# Patient Record
Sex: MALE | Race: WHITE | NOT HISPANIC OR LATINO | Employment: UNEMPLOYED
[De-identification: names, ages, dates, MRNs, and addresses within clinical notes are randomized per-mention and may not be internally consistent; named-entity substitution may affect disease eponyms.]

---

## 2023-05-21 ENCOUNTER — HEALTH MAINTENANCE LETTER (OUTPATIENT)
Age: 23
End: 2023-05-21

## 2023-05-23 ENCOUNTER — OFFICE VISIT (OUTPATIENT)
Dept: INTERNAL MEDICINE | Facility: CLINIC | Age: 23
End: 2023-05-23
Payer: COMMERCIAL

## 2023-05-23 ENCOUNTER — LAB (OUTPATIENT)
Dept: LAB | Facility: CLINIC | Age: 23
End: 2023-05-23
Payer: COMMERCIAL

## 2023-05-23 VITALS
HEIGHT: 75 IN | OXYGEN SATURATION: 98 % | BODY MASS INDEX: 26.96 KG/M2 | HEART RATE: 72 BPM | SYSTOLIC BLOOD PRESSURE: 166 MMHG | WEIGHT: 216.8 LBS | DIASTOLIC BLOOD PRESSURE: 106 MMHG

## 2023-05-23 DIAGNOSIS — R03.0 ELEVATED BLOOD PRESSURE READING WITHOUT DIAGNOSIS OF HYPERTENSION: ICD-10-CM

## 2023-05-23 DIAGNOSIS — Z13.220 SCREENING FOR HYPERLIPIDEMIA: ICD-10-CM

## 2023-05-23 DIAGNOSIS — Z11.59 ENCOUNTER FOR HEPATITIS C SCREENING TEST FOR LOW RISK PATIENT: ICD-10-CM

## 2023-05-23 DIAGNOSIS — Z00.00 ROUTINE GENERAL MEDICAL EXAMINATION AT A HEALTH CARE FACILITY: Primary | ICD-10-CM

## 2023-05-23 LAB
ANION GAP SERPL CALCULATED.3IONS-SCNC: 10 MMOL/L (ref 7–15)
BUN SERPL-MCNC: 7.1 MG/DL (ref 6–20)
CALCIUM SERPL-MCNC: 9.8 MG/DL (ref 8.6–10)
CHLORIDE SERPL-SCNC: 107 MMOL/L (ref 98–107)
CHOLEST SERPL-MCNC: 243 MG/DL
CREAT SERPL-MCNC: 0.85 MG/DL (ref 0.51–1.17)
DEPRECATED HCO3 PLAS-SCNC: 24 MMOL/L (ref 22–29)
GFR SERPL CREATININE-BSD FRML MDRD: >90 ML/MIN/1.73M2
GLUCOSE SERPL-MCNC: 93 MG/DL (ref 70–99)
HCV AB SERPL QL IA: NONREACTIVE
HDLC SERPL-MCNC: 68 MG/DL
HOLD SPECIMEN: NORMAL
LDLC SERPL CALC-MCNC: 166 MG/DL
NONHDLC SERPL-MCNC: 175 MG/DL
POTASSIUM SERPL-SCNC: 4.9 MMOL/L (ref 3.4–5.3)
SODIUM SERPL-SCNC: 141 MMOL/L (ref 136–145)
TRIGL SERPL-MCNC: 44 MG/DL

## 2023-05-23 PROCEDURE — 80061 LIPID PANEL: CPT | Performed by: PATHOLOGY

## 2023-05-23 PROCEDURE — 80048 BASIC METABOLIC PNL TOTAL CA: CPT | Performed by: PATHOLOGY

## 2023-05-23 PROCEDURE — 36415 COLL VENOUS BLD VENIPUNCTURE: CPT | Performed by: PATHOLOGY

## 2023-05-23 PROCEDURE — 86803 HEPATITIS C AB TEST: CPT | Performed by: INTERNAL MEDICINE

## 2023-05-23 PROCEDURE — 99385 PREV VISIT NEW AGE 18-39: CPT | Mod: GE

## 2023-05-23 NOTE — PROGRESS NOTES
[facility]         HPI:       Ebenezer Anand is a 23 year old adult with no significant PMH who presents for an annual physical. Graduated in 2020. Does a lot of walking, likes hiking and camping. Denies any tick exposure. Exercises through [employer], practices every day for about 20 minutes. Walks about 2.5 miles every day. Has a high carb diet, likes bagels with cream, pizzas, noodles, eggs. Would be open to adding some greens in his every day diet. Denies any joint pain. Denies any recent weight loss. He is open to getting a blood pressure cuff. Family history of hypertension, does not know at what age he was diagnosed. No history of kidney problems. No history of congenital heart disease. He does vape cannabis, about 10 puffs daily. Eight drinks per week. Has one female partner. Does use condoms. Had STDs checked last month. Tetanus and covid booster pending. All other immunizations up to date. Discussed with patient the elevated blood pressure. The first reading of elevated blood pressure was one year ago, three years ago his blood pressure was normal. Mayco notes that his dad and several of his uncles have hypertension. Denies any other symptom.            Review of Systems:     ROS  I have personally reviewed and updated the complete ROS on the day of the visit.             Past Medical History:   No past medical history on file.         Past Surgical History:   No past surgical history on file.    No current outpatient medications on file.        No Known Allergies         Family History:   No family history on file.         Social History:     Social History     Socioeconomic History     Marital status: Single     Spouse name: Not on file     Number of children: Not on file     Years of education: Not on file     Highest education level: Not on file   Occupational History     Not on file   Tobacco Use     Smoking status: Every Day     Types: Cigarettes, Vaping Device     Start date:  "1/1/2018     Smokeless tobacco: Never   Vaping Use     Vaping status: Not on file   Substance and Sexual Activity     Alcohol use: Yes     Alcohol/week: 8.0 standard drinks of alcohol     Types: 8 Standard drinks or equivalent per week     Drug use: Yes     Types: Marijuana     Comment: pt takes edibles     Sexual activity: Not on file   Other Topics Concern     Not on file   Social History Narrative     Not on file     Social Determinants of Health     Financial Resource Strain: Not on file   Food Insecurity: Not on file   Transportation Needs: Not on file   Physical Activity: Not on file   Stress: Not on file   Social Connections: Not on file   Intimate Partner Violence: Not on file   Housing Stability: Not on file            Physical Exam:   BP (!) 166/106 (BP Location: Right arm, Patient Position: Sitting, Cuff Size: Adult Regular)   Pulse 72   Ht 1.906 m (6' 3.04\")   Wt 98.3 kg (216 lb 12.8 oz)   SpO2 98%   BMI 27.07 kg/m    Body mass index is 27.07 kg/m .  Vitals were reviewed       GENERAL APPEARANCE: healthy, alert and no distress     EYES: EOMI,  PERRL     HENT: ear canals and TM's normal and nose and mouth without ulcers or lesions     NECK: no adenopathy, no asymmetry, masses, or scars and thyroid normal to palpation     RESP: lungs clear to auscultation - no rales, rhonchi or wheezes     CV: regular rates and rhythm, split S1, S2, no S3 or S4 and no murmur, click or rub     ABDOMEN:  soft, nontender, no HSM or masses and bowel sounds normal     MS: extremities normal- no gross deformities noted, no evidence of inflammation in joints, FROM in all extremities.     SKIN: no suspicious lesions or rashes     NEURO: Normal strength and tone, sensory exam grossly normal, mentation intact and speech normal     PSYCH: mentation appears normal. and affect normal/bright     LYMPHATICS: No cervical adenopathy    Assessment and Plan     Ebenezer was seen today for establish care.    Diagnoses and all orders for " this visit:    Elevated blood pressure reading without diagnosis of hypertension  -     Basic metabolic panel  (Ca, Cl, CO2, Creat, Gluc, K, Na, BUN); Future    Screening for hyperlipidemia  -     Lipid panel reflex to direct LDL Fasting; Future    Encounter for hepatitis C screening test for low risk patient  -     Hepatitis C antibody; Future      Mayco will obtain a blood pressure cuff and get weekly blood pressure measurements that he will bring to the clinic in our next appointment. Technique to get accurate readings demonstrated in the clinic. We will obtain a BMP to assess for end-organ damage. Of note, the patient has a blood pressure gradient (R arm BP > L arm -146). We will check BP on both arms, if the gradient persists, we will need to assess for post ductal aortic stenosis. Regarding diet, Mayco is near his goal weight. We will try to diversify his diet by adding five portions of green vegetables every week. Due for Covid and Tdap booster, Mayco agreed to a pharmacy walk-in appointment to have them administered.    Options for treatment and follow-up care were reviewed with the patient. Ebenezer Anand engaged in the decision making process and verbalized understanding of the options discussed and agreed with the final plan.    Follow-up with me in three months    Kandy Dang MD  Internal Medicine, PGY-2  [facility]  [phone]   May 23, 2023      Pt was seen and plan of care discussed with Dr. Mendosa.

## 2023-05-23 NOTE — NURSING NOTE
"Ebenezer Anand is a 23 year old adult patient that presents today in clinic for the following:    Chief Complaint   Patient presents with     Establish Care     Pt here to establish care and for annual physical.     The patient's allergies and medications were reviewed as noted. A set of vitals were recorded as noted without incident: BP (!) 166/106 (BP Location: Right arm, Patient Position: Sitting, Cuff Size: Adult Regular)   Pulse 72   Ht 1.906 m (6' 3.04\")   Wt 98.3 kg (216 lb 12.8 oz)   SpO2 98%   BMI 27.07 kg/m  . The patient does not have any other questions for the provider.    Helen Velazquez, EMT at 12:50 PM on 5/23/2023  "

## 2023-08-22 ENCOUNTER — LAB (OUTPATIENT)
Dept: LAB | Facility: CLINIC | Age: 23
End: 2023-08-22
Payer: COMMERCIAL

## 2023-08-22 ENCOUNTER — OFFICE VISIT (OUTPATIENT)
Dept: INTERNAL MEDICINE | Facility: CLINIC | Age: 23
End: 2023-08-22
Payer: COMMERCIAL

## 2023-08-22 VITALS
WEIGHT: 217.4 LBS | SYSTOLIC BLOOD PRESSURE: 160 MMHG | BODY MASS INDEX: 27.03 KG/M2 | DIASTOLIC BLOOD PRESSURE: 98 MMHG | HEIGHT: 75 IN | OXYGEN SATURATION: 96 % | HEART RATE: 106 BPM

## 2023-08-22 DIAGNOSIS — I15.9 SECONDARY HYPERTENSION: ICD-10-CM

## 2023-08-22 DIAGNOSIS — I11.9 HYPERTENSIVE HEART DISEASE WITHOUT HEART FAILURE: ICD-10-CM

## 2023-08-22 DIAGNOSIS — R03.0 ELEVATED BLOOD PRESSURE READING WITHOUT DIAGNOSIS OF HYPERTENSION: ICD-10-CM

## 2023-08-22 LAB — TSH SERPL DL<=0.005 MIU/L-ACNC: 2.52 UIU/ML (ref 0.3–4.2)

## 2023-08-22 PROCEDURE — 99213 OFFICE O/P EST LOW 20 MIN: CPT | Mod: GE

## 2023-08-22 PROCEDURE — 82088 ASSAY OF ALDOSTERONE: CPT | Performed by: PEDIATRICS

## 2023-08-22 PROCEDURE — 99000 SPECIMEN HANDLING OFFICE-LAB: CPT | Performed by: PATHOLOGY

## 2023-08-22 PROCEDURE — 36415 COLL VENOUS BLD VENIPUNCTURE: CPT | Performed by: PATHOLOGY

## 2023-08-22 PROCEDURE — 83835 ASSAY OF METANEPHRINES: CPT | Mod: 90 | Performed by: PATHOLOGY

## 2023-08-22 PROCEDURE — 84443 ASSAY THYROID STIM HORMONE: CPT | Performed by: PATHOLOGY

## 2023-08-22 PROCEDURE — 84244 ASSAY OF RENIN: CPT | Mod: 90 | Performed by: PATHOLOGY

## 2023-08-22 NOTE — PROGRESS NOTES
[facility]         HPI:       Ebenezer Anand is a 23 year old adult with no significant PMH who presents for a blood pressure follow-up. The patient was found to have elevated blood pressure on prior visit and was instructed to obtain a blood pressure cuff to check his blood pressure regularly and come for a follow-up. The patient has been recording his blood pressure routinely for the last two weeks and has found it to be consistently elevated in the 150s-170s/100s. He does not have any other symptoms today, denying chest pain, headaches, vomiting, nausea, any change in his bowel or urinary habits or any change in his activity levels. Consumes about 6 glasses of alcohol week. Doesn't have palpitations. He occasionally snores at night, has one awakening during the night, but does not wake up tired and does not nap during the day. Thinks his dad had hypertension but no other family history of hypertension or early heart disease.         Review of Systems:     ROS  I have personally reviewed and updated the complete ROS on the day of the visit.             Past Medical History:   No past medical history on file.         Past Surgical History:   No past surgical history on file.    No current outpatient medications on file.        No Known Allergies         Family History:   No family history on file.         Social History:     Social History     Socioeconomic History     Marital status: Single     Spouse name: Not on file     Number of children: Not on file     Years of education: Not on file     Highest education level: Not on file   Occupational History     Not on file   Tobacco Use     Smoking status: Every Day     Types: Cigarettes, Vaping Device     Start date: 1/1/2018     Smokeless tobacco: Never   Substance and Sexual Activity     Alcohol use: Yes     Alcohol/week: 8.0 standard drinks of alcohol     Types: 8 Standard drinks or equivalent per week     Drug use: Yes     Types: Marijuana      "Comment: pt takes edibles     Sexual activity: Not on file   Other Topics Concern     Not on file   Social History Narrative     Not on file     Social Determinants of Health     Financial Resource Strain: Not on file   Food Insecurity: Not on file   Transportation Needs: Not on file   Physical Activity: Not on file   Stress: Not on file   Social Connections: Not on file   Intimate Partner Violence: Not on file   Housing Stability: Not on file            Physical Exam:   BP (!) 153/102 (BP Location: Right arm, Patient Position: Sitting, Cuff Size: Adult Regular)   Pulse 106   Ht 1.906 m (6' 3.04\")   Wt 98.6 kg (217 lb 6.4 oz)   SpO2 96%   BMI 27.14 kg/m    Body mass index is 27.14 kg/m .  Vitals were reviewed       GENERAL APPEARANCE: healthy, alert and no distress     EYES: EOMI,  PERRL     HENT: ear canals and TM's normal and nose and mouth without ulcers or lesions     NECK: no adenopathy, no asymmetry, masses, or scars and thyroid normal to palpation, no bruit appreciated     RESP: lungs clear to auscultation - no rales, rhonchi or wheezes     CV: regular rates and rhythm, normal S1 S2, no S3 or S4 and no murmur, click or rub     ABDOMEN:  soft, nontender, no HSM or masses and bowel sounds normal     MS: extremities normal- no gross deformities noted, no evidence of inflammation in joints, FROM in all extremities.     SKIN: no suspicious lesions or rashes     NEURO: Normal strength and tone, sensory exam grossly normal, mentation intact and speech normal     PSYCH: mentation appears normal. and affect normal/bright      Assessment and Plan     Mayco was seen today for blood pressure follow-up    #Hypertension  Multiple home blood pressure measurements consistent with hypertension. Mayco does not have any known risk factor for hypertension and is not aware of any pertinent family history. Will likely need to start a medication. Will perform secondary hypertension work-up.   - Urine/serum metanephrines  - " Renin/Aldosterone ratio  - TTE  - Ambulatory BP monitoring  - TSH with T4 reflex  - Follow-up in two months  - Await results prior to starting treatment    #Health maintenance  Up to date with immunizations, std screenings, no age appropriate screening required at this time.       Options for treatment and follow-up care were reviewed with the patient. Ebenezer Anand engaged in the decision making process and verbalized understanding of the options discussed and agreed with the final plan.    Kandy Dang MD  Internal Medicine, PGY-3  [facility]  [phone]   Aug 22, 2023      Plan of care discussed with Dr. Handy

## 2023-08-22 NOTE — NURSING NOTE
"Ebenezer Anand is a 23 year old adult patient that presents today in clinic for the following:    Chief Complaint   Patient presents with    Follow Up     3 month follow up     The patient's allergies and medications were reviewed as noted. A set of vitals were recorded as noted without incident: BP (!) 153/102 (BP Location: Right arm, Patient Position: Sitting, Cuff Size: Adult Regular)   Pulse 106   Ht 1.906 m (6' 3.04\")   Wt 98.6 kg (217 lb 6.4 oz)   SpO2 96%   BMI 27.14 kg/m  . The patient does not have any other questions for the provider.    David Briggs, EMT 12:18 PM on 8/22/2023   "

## 2023-08-25 LAB
ALDOST SERPL-MCNC: 15.8 NG/DL (ref 0–31)
ANNOTATION COMMENT IMP: ABNORMAL
METANEPHS SERPL-SCNC: 0.25 NMOL/L
NORMETANEPHRINE SERPL-SCNC: 1.39 NMOL/L
RENIN PLAS-CCNC: 2.6 NG/ML/HR

## 2023-08-28 ENCOUNTER — HOSPITAL ENCOUNTER (OUTPATIENT)
Dept: CARDIOLOGY | Facility: CLINIC | Age: 23
Discharge: HOME | End: 2023-08-28
Attending: PEDIATRICS
Payer: COMMERCIAL

## 2023-08-28 DIAGNOSIS — R03.0 ELEVATED BLOOD PRESSURE READING WITHOUT DIAGNOSIS OF HYPERTENSION: ICD-10-CM

## 2023-08-28 DIAGNOSIS — I15.9 SECONDARY HYPERTENSION: ICD-10-CM

## 2023-08-28 DIAGNOSIS — I11.9 HYPERTENSIVE HEART DISEASE WITHOUT HEART FAILURE: ICD-10-CM

## 2023-08-28 LAB
ALDOST/RENIN PLAS-RTO: 6.1 {RATIO} (ref 0–25)
LVEF ECHO: NORMAL

## 2023-08-28 PROCEDURE — 93306 TTE W/DOPPLER COMPLETE: CPT | Mod: 26 | Performed by: STUDENT IN AN ORGANIZED HEALTH CARE EDUCATION/TRAINING PROGRAM

## 2023-08-28 PROCEDURE — 93306 TTE W/DOPPLER COMPLETE: CPT

## 2023-08-28 PROCEDURE — 93786 AMBL BP MNTR W/SW REC ONLY: CPT

## 2023-08-28 PROCEDURE — 93790 AMBL BP MNTR W/SW I&R: CPT | Performed by: INTERNAL MEDICINE

## 2023-08-29 DIAGNOSIS — R79.89 ELEVATED PLASMA METANEPHRINES: Primary | ICD-10-CM

## 2023-09-05 RX ORDER — AMLODIPINE BESYLATE 5 MG/1
5 TABLET ORAL DAILY
Qty: 30 TABLET | Refills: 0 | Status: SHIPPED | OUTPATIENT
Start: 2023-09-05 | End: 2024-03-18

## 2024-03-15 ENCOUNTER — TELEPHONE (OUTPATIENT)
Dept: BEHAVIORAL HEALTH | Facility: CLINIC | Age: 24
End: 2024-03-15

## 2024-03-15 NOTE — TELEPHONE ENCOUNTER
"Pt is a(n) adult (18+ out of HS) Seeking as eval for Adult Mental Health DA for Programmatic Care - Program Preference? No.  Appointment scheduled by:  Patient.  (self-pay - complete Cost Estimate)  Caller name:  Mayco Anand    Caller phone #: [phone]  Legal Guardianship Reviewed?  No  Honoring Choices Notified?  No  Brief reason for appt:  MH eval     needed?  NO    Contact information verified/updated: Yes    Kary Morrison    \"We have scheduled your evaluation. In the event that your insurance coverage comes back as out of network, you may receive a call to cancel your appointment and direct you to your insurance company for in-network coverage.\"    Disclaimer regarding insurance read to patient?  Yes   "

## 2024-03-18 ENCOUNTER — VIRTUAL VISIT (OUTPATIENT)
Dept: FAMILY MEDICINE | Facility: CLINIC | Age: 24
End: 2024-03-18
Payer: COMMERCIAL

## 2024-03-18 DIAGNOSIS — R79.89 ELEVATED PLASMA METANEPHRINES: ICD-10-CM

## 2024-03-18 DIAGNOSIS — R03.0 ELEVATED BP WITHOUT DIAGNOSIS OF HYPERTENSION: Primary | ICD-10-CM

## 2024-03-18 PROCEDURE — 99203 OFFICE O/P NEW LOW 30 MIN: CPT | Mod: 95 | Performed by: PHYSICIAN ASSISTANT

## 2024-03-18 NOTE — PROGRESS NOTES
"Mayco is a 24 year old who is being evaluated via a billable video visit.    How would you like to obtain your AVS? MyChart  If the video visit is dropped, the invitation should be resent by: Text to cell phone: [phone]  Will anyone else be joining your video visit? No      Assessment & Plan     Elevated BP without diagnosis of hypertension  Not taking BP med.  Has not noticed huge spikes    Elevated plasma metanephrines  Discussed the work up that is in process, and he does plan to get imaging studies done.            Nicotine/Tobacco Cessation  Mayco Anand reports that Mayco Anand has been smoking cigarettes and vaping device. Mayco Anand started smoking about 6 years ago. Mayco Anand has never used smokeless tobacco.  Nicotine/Tobacco Cessation Plan  Information offered: Patient not interested at this time      BMI  Estimated body mass index is 27.14 kg/m  as calculated from the following:    Height as of 8/22/23: 1.906 m (6' 3.04\").    Weight as of 8/22/23: 98.6 kg (217 lb 6.4 oz).             Subjective   Mayco is a 24 year old, presenting for the following health issues:  Hypertension         No data to display              HPI     Hypertension Follow-up    Do you check your blood pressure regularly outside of the clinic? No   Are you following a low salt diet? No  Are your blood pressures ever more than 140 on the top number (systolic) OR more   than 90 on the bottom number (diastolic), for example 140/90? unknown    How many servings of fruits and vegetables do you eat daily?  2-3  On average, how many sweetened beverages do you drink each day (Examples: soda, juice, sweet tea, etc.  Do NOT count diet or artificially sweetened beverages)?   1  How many days per week do you exercise enough to make your heart beat faster? 3 or less  How many minutes a day do you exercise enough to make your heart beat faster? 30 - 60  How many days per week do you miss taking your medication? Not " applicable          Review of Systems  Constitutional, HEENT, cardiovascular, pulmonary, gi and gu systems are negative, except as otherwise noted.      Objective           Vitals:  No vitals were obtained today due to virtual visit.    Physical Exam   GENERAL: alert and no distress  EYES: Eyes grossly normal to inspection.  No discharge or erythema, or obvious scleral/conjunctival abnormalities.  RESP: No audible wheeze, cough, or visible cyanosis.    SKIN: Visible skin clear. No significant rash, abnormal pigmentation or lesions.  NEURO: Cranial nerves grossly intact.  Mentation and speech appropriate for age.  PSYCH: Appropriate affect, tone, and pace of words          Video-Visit Details    Type of service:  Video Visit   Originating Location (pt. Location): Home    Distant Location (provider location):  On-site  Platform used for Video Visit: Olga  Signed Electronically by: Vipul Fung PA-C

## 2024-03-20 ENCOUNTER — VIRTUAL VISIT (OUTPATIENT)
Dept: BEHAVIORAL HEALTH | Facility: CLINIC | Age: 24
End: 2024-03-20
Payer: COMMERCIAL

## 2024-03-20 DIAGNOSIS — F90.0 ATTENTION DEFICIT HYPERACTIVITY DISORDER (ADHD), PREDOMINANTLY INATTENTIVE TYPE: Primary | ICD-10-CM

## 2024-03-20 DIAGNOSIS — F41.1 GENERALIZED ANXIETY DISORDER: ICD-10-CM

## 2024-03-20 DIAGNOSIS — F33.0 DEPRESSION, MAJOR, RECURRENT, MILD (H): ICD-10-CM

## 2024-03-20 PROCEDURE — 90791 PSYCH DIAGNOSTIC EVALUATION: CPT | Mod: 95 | Performed by: SOCIAL WORKER

## 2024-03-20 ASSESSMENT — PATIENT HEALTH QUESTIONNAIRE - PHQ9
SUM OF ALL RESPONSES TO PHQ QUESTIONS 1-9: 6
SUM OF ALL RESPONSES TO PHQ QUESTIONS 1-9: 6
10. IF YOU CHECKED OFF ANY PROBLEMS, HOW DIFFICULT HAVE THESE PROBLEMS MADE IT FOR YOU TO DO YOUR WORK, TAKE CARE OF THINGS AT HOME, OR GET ALONG WITH OTHER PEOPLE: SOMEWHAT DIFFICULT

## 2024-03-20 ASSESSMENT — COLUMBIA-SUICIDE SEVERITY RATING SCALE - C-SSRS
1. HAVE YOU WISHED YOU WERE DEAD OR WISHED YOU COULD GO TO SLEEP AND NOT WAKE UP?: NO
ATTEMPT LIFETIME: NO
2. HAVE YOU ACTUALLY HAD ANY THOUGHTS OF KILLING YOURSELF?: NO
6. HAVE YOU EVER DONE ANYTHING, STARTED TO DO ANYTHING, OR PREPARED TO DO ANYTHING TO END YOUR LIFE?: NO
TOTAL  NUMBER OF ABORTED OR SELF INTERRUPTED ATTEMPTS LIFETIME: NO
TOTAL  NUMBER OF INTERRUPTED ATTEMPTS LIFETIME: NO

## 2024-03-20 NOTE — PROGRESS NOTES
"    [facility]      PATIENT'S NAME: Ebenezer Anand  PREFERRED NAME: Mayco  PRONOUNS:       MRN: [id number]  : 2000  ADDRESS: [address] [place], [place] [address]T. NUMBER:  [id number]  DATE OF SERVICE: 3/20/24  START TIME: 820 am  END TIME: 848 am  PREFERRED PHONE: [phone]  May we leave a program related message: Yes  EMERGENCY CONTACT: was obtained Edvin Anand (XAVIER) [phone] .  SERVICE MODALITY:  Video Visit:      Provider verified identity through the following two step process.  Patient provided:  Patient photo and Patient     Telemedicine Visit: The patient's condition can be safely assessed and treated via synchronous audio and visual telemedicine encounter.      Reason for Telemedicine Visit: Patient convenience (e.g. access to timely appointments / distance to available provider)    Originating Site (Patient Location): Patient's home    Distant Site (Provider Location): [facility]    Consent:  The patient/guardian has verbally consented to: the potential risks and benefits of telemedicine (video visit) versus in person care; bill my insurance or make self-payment for services provided; and responsibility for payment of non-covered services.     Patient would like the video invitation sent by:  My Chart    Mode of Communication:  Video Conference via Amwell    Distant Location (Provider):  On-site    As the provider I attest to compliance with applicable laws and regulations related to telemedicine.    UNIVERSAL ADULT Mental Health DIAGNOSTIC ASSESSMENT    Identifying Information:  Patient is a 24 year old,   individual.  Patient was referred for an assessment by self.  Patient attended the session alone.    Chief Complaint:   The reason for seeking services at this time is: \"anxiety, panic, procrastination, and possibly ADHD, Autism, depression\". States taht he's been " "thinkinga bout getting tested for ADHD/ASD for awhile, \"never been tested before\". States that they feel they've been self medicating with cannabis and etoh. He quit using in early March after having a bad experience. The problem(s) began 01/01/12.    Patient has attempted to resolve these concerns in the past through medication     Referral for testing.     Social/Family History:  Patient reported they grew up in other [place], [place].  They were raised by biological parents  .  Parents were always together. Pt reports one older brother and one older sister.  Patient reported that their childhood was \"lots of activities, siblings in lots of activies, used to close to extended family\".  Patient described their current relationships with family of origin as \"good, less frequent contact\".     The patient describes their cultural background as .  Cultural influences and impact on patient's life structure, values, norms, and healthcare: [place]urbs, not much Taoism, public schools, ....  Contextual influences on patient's health include: unemployed, financial stress.    These factors will be addressed in the Preliminary Treatment plan. Patient identified their preferred language to be English. Patient reported they does not need the assistance of an  or other support involved in therapy.     Patient reported had no significant delays in developmental tasks.   Patient's highest education level was college graduate  .at the [employer].  Patient identified the following learning problems: attention.  Modifications will not be used to assist communication in therapy.  Patient reports they are  able to understand written materials.    Patient reported the following relationship history never .  Patient's current relationship status is has a partner or significant other for 3 years. Pt reports that the relationship is \"good\".   Patient identified their sexual orientation as bi-sexual.  Patient reported having  "  no child(mario). Patient identified partner; parents; siblings as part of their support system.  Patient identified the quality of these relationships as good,  .      Patient's current living/housing situation involves staying with someone.  The immediate members of family and household include Mirian Anand, 68,Mom  and they report that housing is stable.    Patient is currently unemployed.  Patient reports their finances are obtained through parents; other. Patient does identify finances as a current stressor.      Patient reported that they have not been involved with the legal system.    . Patient does not report being under probation/ parole/ jurisdiction. They are not under any current court jurisdiction. .    Patient's Strengths and Limitations:  Patient identified the following strengths or resources that will help them succeed in treatment: friends / good social support, family support, insight, intelligence, and motivation. Things that may interfere with the patient's success in treatment include: none identified.     Assessments:  The following assessments were completed by patient for this visit:  PHQ2:       5/23/2023    12:50 PM   PHQ-2 ( 1999 [employer])   Q1: Little interest or pleasure in doing things 0   Q2: Feeling down, depressed or hopeless 0   PHQ-2 Score 0     PHQ9:       3/20/2024     8:12 AM   PHQ-9 SCORE   PHQ-9 Total Score MyChart 6 (Mild depression)   PHQ-9 Total Score 6     GAD2:       3/20/2024     8:12 AM   BRONSON-2   Feeling nervous, anxious, or on edge 1   Not being able to stop or control worrying 1   BRONSON-2 Total Score 2     GAD7:        No data to display              CAGE-AID:       3/20/2024     8:14 AM   CAGE-AID Total Score   Total Score 3   Total Score MyChart 3 (A total score of 2 or greater is considered clinically significant)     PROMIS 10-Global Health (all questions and answers displayed):       3/20/2024     8:13 AM   PROMIS 10   In general, would you say your health is: Very  good   In general, would you say your quality of life is: Good   In general, how would you rate your physical health? Very good   In general, how would you rate your mental health, including your mood and your ability to think? Good   In general, how would you rate your satisfaction with your social activities and relationships? Good   In general, please rate how well you carry out your usual social activities and roles Very good   To what extent are you able to carry out your everyday physical activities such as walking, climbing stairs, carrying groceries, or moving a chair? Completely   In the past 7 days, how often have you been bothered by emotional problems such as feeling anxious, depressed, or irritable? Rarely   In the past 7 days, how would you rate your fatigue on average? Mild   In the past 7 days, how would you rate your pain on average, where 0 means no pain, and 10 means worst imaginable pain? 2   In general, would you say your health is: 4   In general, would you say your quality of life is: 3   In general, how would you rate your physical health? 4   In general, how would you rate your mental health, including your mood and your ability to think? 3   In general, how would you rate your satisfaction with your social activities and relationships? 3   In general, please rate how well you carry out your usual social activities and roles. (This includes activities at home, at work and in your community, and responsibilities as a parent, child, spouse, employee, friend, etc.) 4   To what extent are you able to carry out your everyday physical activities such as walking, climbing stairs, carrying groceries, or moving a chair? 5   In the past 7 days, how often have you been bothered by emotional problems such as feeling anxious, depressed, or irritable? 2   In the past 7 days, how would you rate your fatigue on average? 2   In the past 7 days, how would you rate your pain on average, where 0 means no pain,  and 10 means worst imaginable pain? 2   Global Mental Health Score 13   Global Physical Health Score 17   PROMIS TOTAL - SUBSCORES 30     PROMIS 10-Global Health (only subscores and total score):       3/20/2024     8:13 AM   PROMIS-10 Scores Only   Global Mental Health Score 13   Global Physical Health Score 17   PROMIS TOTAL - SUBSCORES 30     [place] Suicide Severity Rating Scale (Lifetime/Recent)      3/20/2024     8:50 AM   [place] Suicide Severity Rating (Lifetime/Recent)   Q1 Wish to be Dead (Lifetime) N   Q2 Non-Specific Active Suicidal Thoughts (Lifetime) N   Actual Attempt (Lifetime) N   Has subject engaged in non-suicidal self-injurious behavior? (Lifetime) N   Interrupted Attempts (Lifetime) N   Aborted or Self-Interrupted Attempt (Lifetime) N   Preparatory Acts or Behavior (Lifetime) N   Calculated C-SSRS Risk Score (Lifetime/Recent) No Risk Indicated       Personal and Family Medical History:  Patient does not report a family history of mental health concerns.  Patient reports family history is not on file..     Patient does not report Mental Health Diagnosis and/or Treatment.  Patient reported the following previous diagnoses which include(s):   .  Patient reported symptoms began 2012.  Patient has not received mental health services in the past:     .  Psychiatric Hospitalizations:   none when   ,  ,  ,  ,  ,  ,  ,  ,  ,  ,  .    Patient denies a history of civil commitment.      Currently, patient    is not receiving other mental health services.  These include none.       Patient has had a physical exam to rule out medical causes for current symptoms.  Date of last physical exam was within the past year. Client was encouraged to follow up with PCP if symptoms were to develop. The patient has a [facility] Provider, who is named Kandy Dang..  Patient reports the following current medical concerns: being evaluated for possible high blood pressure .  Patient denies any issues  with pain..   There are not significant appetite / nutritional concerns / weight changes.   Patient does not report a history of head injury / trauma / cognitive impairment.      Patient reports not taking any current medications    Medication Adherence:  Patient reports not currently prescribed.  .    Patient Allergies:  No Known Allergies    Medical History:  No past medical history on file.      Current Mental Status Exam:   Appearance:  Appropriate    Eye Contact:  Good   Psychomotor:  Normal       Gait / station:  no problem  Attitude / Demeanor: Cooperative   Speech      Rate / Production: Normal/ Responsive      Volume:  Normal  volume      Language:  intact  Mood:   Normal  Affect:   Appropriate    Thought Content: Clear   Thought Process: Coherent  Logical       Associations: No loosening of associations  Insight:   Good   Judgment:  Intact   Orientation:  All  Attention/concentration: Good    Substance Use:   Patient did not report a family history of substance use concerns; see medical history section for details.  Patient has not received chemical dependency treatment in the past.  Patient has not ever been to detox.      Patient is not currently receiving any chemical dependency treatment.           Substance History of use Age of first use Date of last use     Pattern and duration of use (include amounts and frequency)   Alcohol used in the past   19 03/03/24 Was drinking regularly prior to early March (7-10 drinks per week). Binge drinking.    Cannabis   used in the past 14 03/05/24 Had a bad experience. Prior to March 5 nearly daily use. Most common method vaping     Amphetamines   never used     REPORTS SUBSTANCE USE: N/A   Cocaine/crack    never used       REPORTS SUBSTANCE USE: N/A   Hallucinogens used in the past   20 05/06/20  REPORTS SUBSTANCE USE: N/A   Inhalants never used         REPORTS SUBSTANCE USE: N/A   Heroin never used         REPORTS SUBSTANCE USE: N/A   Other Opiates never used      REPORTS SUBSTANCE USE: N/A   Benzodiazepine   never used     REPORTS SUBSTANCE USE: N/A   Barbiturates never used     REPORTS SUBSTANCE USE: N/A   Over the counter meds never used     REPORTS SUBSTANCE USE: N/A   Caffeine currently use 12   unremarkable   Nicotine  never used     REPORTS SUBSTANCE USE: N/A   Other substances not listed above:  Identify:  never used     REPORTS SUBSTANCE USE: N/A     Patient reported the following problems as a result of their substance use: academic.    Substance Use:  pt reports he became grandiose and paranoid after using cannabis last time so he quit both cannabis and alcohol    Based on the positive CAGE score and clinical interview there   Pt reports that he has quit using on his own. Suspects he was self medicating his mental health sx .    Significant Losses / Trauma / Abuse / Neglect Issues:   Patient did not  serve in the .  There are indications or report of significant loss, trauma, abuse or neglect issues related to: are no indications and client denies any losses, trauma, abuse, or neglect concerns.  Concerns for possible neglect are not present.     Safety Assessment:   Patient denies current homicidal ideation and behaviors.  Patient denies current self-injurious ideation and behaviors.    Patient denied risk behaviors associated with substance use.   Patient denies any high risk behaviors associated with mental health symptoms.  Patient reports the following current concerns for their personal safety: None.  Patient reports there are not firearms in the house.       .    History of Safety Concerns:  Patient denied a history of homicidal ideation.     Patient denied a history of personal safety concerns.    Patient denied a history of assaultive behaviors.    Patient denied a history of sexual assault behaviors.     Patient denied a history of risk behaviors associated with substance use.  Patient denies any history of high risk behaviors associated with  mental health symptoms.  Patient reports the following protective factors: dedication to family or friends; safe and stable environment; living with other people; daily obligations; effective problem solving skills; positive social skills    Risk Plan:  See Recommendations for Safety and Risk Management Plan    Review of Symptoms per patient report:   Depression: Excessive or inappropriate guilt, Difficulties concentrating, Feelings of hopelessness, Ruminations, Feeling sad, down, or depressed, and seasonal, hypersomnia, fair motivation  Cate:  Elevated mood, Grandiosity, Increased activity, Decreased need for sleep, and Restlessness  Psychosis: No Symptoms  Anxiety: Excessive worry, Nervousness, Social anxiety, and Ruminations  Panic:  Palpitations, Tremors, Shortness of breath, Tingling, and infrequent, last one was 2 weeks ago after using too much cannabis  Post Traumatic Stress Disorder:  No Symptoms   Eating Disorder: No Symptoms  ADD / ADHD:  Inattentive, Difficulties listening, Poor task completion, Poor organizational skills, Distractibility, Forgetful, and Impulsive  Conduct Disorder: No symptoms  Autism Spectrum Disorder: Deficits in social communication and social interactions, Inflexible adherence to routines, Highly restricted fixated interests that are abnormal in intensity or focus, and Hyper or hyporeacitivty to sensory input or unusual interest in sensory aspects   Obsessive Compulsive Disorder: No Symptoms    Patient reports the following compulsive behaviors and treatment history:  none .      Diagnostic Criteria:   Generalized Anxiety Disorder  A. Excessive anxiety and worry about a number of events or activities (such as work or school performance).   B. The person finds it difficult to control the worry.  C. Select 3 or more symptoms (required for diagnosis). Only one item is required in children.   - Restlessness or feeling keyed up or on edge.    - Difficulty concentrating or mind going  everett.   FIOR. The focus of the anxiety and worry is not confined to features of an Axis I disorder.  E. The anxiety, worry, or physical symptoms cause clinically significant distress or impairment in social, occupational, or other important areas of functioning.   F. The disturbance is not due to the direct physiological effects of a substance (e.g., a drug of abuse, a medication) or a general medical condition (e.g., hyperthyroidism) and does not occur exclusively during a Mood Disorder, a Psychotic Disorder, or a Pervasive Developmental Disorder. Major Depressive Disorder  CRITERIA (A-C) REPRESENT A MAJOR DEPRESSIVE EPISODE - SELECT THESE CRITERIA  A) Recurrent episode(s) - symptoms have been present during the same 2-week period and represent a change from previous functioning 5 or more symptoms (required for diagnosis)   - Depressed mood. Note: In children and adolescents, can be irritable mood.     - Feelings of worthlessness or inappropriate and excessive guilt.    - Diminished ability to think or concentrate, or indecisiveness.   B) The symptoms cause clinically significant distress or impairment in social, occupational, or other important areas of functioning  C) The episode is not attributable to the physiological effects of a substance or to another medical condition  D) The occurence of major depressive episode is not better explained by other thought / psychotic disorders  E) There has never been a manic episode or hypomanic episode Attention Deficit Hyperactivity Disorder  A) A persistent pattern of inattention and/or hyperactivity-impulsivity that interferes with functioning or development, as characterized by (1) Inattention and/or (2) Hyperactivity and Impulsivity  (1) Inattention: 6 or more of the following symptoms have persisted for at least 6 months to a degree that is inconsistent with developmental level and that negatively impacts directly on social and academic/occupational activities:  - Often  fails to give close attention to details or makes careless mistakes in schoolwork, at work, or during other activities  - Often has difficulty sustaining attention in tasks or play activities  - Often does not follow through on instructions and fails to finish schoolwork, chores, or duties in the workplace  - Often has difficulty organizing tasks and activities  - Often avoids, dislikes, or is reluctant to engage in tasks that require sustained mental effort  B) Several inattentive or hyperactive-impulsive symptoms were present prior to age 12 years  C) Several inattentive or hyperactive-impulsive symptoms are present in two or more settings  D) There is clear evidence that the symptoms interfere with, or reduce the quality of, social academic, or occupational functioning  E) The Symptoms do not occur exclusively during the course of schizophrenia or another psychotic disorder and are not better explained by another mental disorder    Functional Status:  Patient reports the following functional impairments:  academic performance, management of the household and or completion of tasks, work / vocational responsibilities, and (work and academics by hx .     Nonprogrammatic care:  Patient is requesting basic services to address current mental health concerns.    Clinical Summary:  1. Psychosocial, Cultural and Contextual Factors: none noted  .  2. Principal DSM5 Diagnoses  (Sustained by DSM5 Criteria Listed Above):   300.02 (F41.1) Generalized Anxiety Disorder.  3. Other Diagnoses that is relevant to services:   Attention-Deficit/Hyperactivity Disorder  314.00 (F90.0) Predominantly inattentive presentation  296.31 (F33.0) Major Depressive Disorder, Recurrent Episode, Mild With anxious distress.  4. Provisional Diagnosis:  Attention-Deficit/Hyperactivity Disorder  314.00 (F90.0) Predominantly inattentive presentation  296.31 (F33.0) Major Depressive Disorder, Recurrent Episode, Mild With anxious distress  300.02 (F41.1)  Generalized Anxiety Disorder as evidenced by ROS, BRONSON, PHQ, chart review and the interview.  .  5. Prognosis: Relieve Acute Symptoms.  6. Likely consequences of symptoms if not treated: Worsening symptoms and diminishing ability to function.  .  7. Client strengths include:  educated, goal-focused, good listener, insightful, intelligent, motivated, and support of family, friends and providers .     Recommendations:     1. Plan for Safety and Risk Management:   Safety and Risk: Recommended that patient call 911 or go to the local ED should there be a change in any of these risk factors..          Report to child / adult protection services was NA.     2. Patient's identified mental health concerns with a cultural influence will be addressed by at the request of the pt .     3. Initial Treatment will focus on:    Depressed Mood - mild  Anxiety - BRONSON  Attentional Problems - inattentive .     4. Resources/Service Plan:    services are not indicated.   Modifications to assist communication are not indicated.   Additional disability accommodations are not indicated.      5. Collaboration:   Collaboration / coordination of treatment will be initiated with the following  support professionals:  none .      6.  Referrals:   The following referral(s) will be initiated:  testing .       A Release of Information has been obtained for the following:  none .     Clinical Substantiation/medical necessity for the above recommendations:  see assessment.    7. ORIN:    ORIN:   pt has quit on his own . Provider gave patient printed information about the  effects of chemical use on their health and well being. Recommendations:  maintain sobriety .     8. Records:   These were reviewed at time of assessment.   Information in this assessment was obtained from the medical record and  provided by patient who is a good historian.    Patient will have open access to their mental health medical record.    9.   Interactive Complexity:  No    10. Safety Plan:  Patient denied any current/recent/lifetime history of suicidal ideation and/or behaviors.  No safety plan indicated at this time.     Provider Name/ Credentials:  Sonia Deleon [facility]  She/her  Office hours: Tuesday-Friday 7:00 AM-5:00 PM   March 20, 2024    Crisis Resources    Refer to the resources below as needed.    Steps to care for yourself    If you are currently in counseling, call your counselor for an appointment  Call the local crisis resources below if needed.  Contact friends or family for support.  Get more exercise.  Do activities you enjoy.  Eat a well-balanced diet and drink plenty of fluids.  Rest as needed.  Limit alcohol and recreational drugs. These can worsen depression.  Properly store or remove fire arms.     When to contact your primary care provider     You have thoughts of harming or killing yourself but have not made a plan to carry it out.  Your depression gets in the way of daily activities.  You are often unable to sleep.  You need help cutting back on alcohol or recreational drugs.    When to call 911 or go to the Emergency Room     Get emergency help right away if you have any of the following:  You are planning to harm or kill yourself and you have a way to carry out the plan.   You have injured yourself or others. Or, you think you will.  You feel confused or are having trouble thinking or remembering.  You are having delusions (false beliefs).  You are hearing voices or seeing things that aren t there.  You are feeling psychotic (paranoid, fearful, restless, agitated, nervous, racing thoughts or speech)    Crisis Resources   The [facility] is an adults only unit located at [facility] is a short term (generally less than 23 hour stay) designed for crisis intervention and stabilization. Pts have the opportunity to meet quickly with a behavioral health team for evaluation in a calm and peaceful therapuetic environment. To be evaluated for  admission pts are triaged throught the [facility] ED.     The [facility] Center (BEC) is located at the [facility].The BEC is open to ages and provides a comprehensive behavioral health evaluation to those in crisis. Patients typically stay 24-36 hours.     The following hotlines are for both adults and children. The and are open 24 hours a day, 7 days a week unless noted otherwise.      Crisis Lines        Gambling Hotline  [phone] [hope]        línea de crisis española  [phone]        [facility] [employer] Hel[employer]ine  611.929.1857        National Hope Line  6.746.381.4145 [hope]        National Suicide Prevention Lifeline: text 988        [employer] (LGBTQ Youth Crisis Line)  2.590.964.5464  text START to 095-160        [place] Crisis Line  9.448.373.5742 (Press 1)  or text 188504        [facility] Crisis  [phone]      [facility] Mobile Crisis  [phone]      [facility] Crisis  [phone]      [facility] Mobile Crisis  [phone] (adults)  [phone] (children)      [facility] Mobile Crisis  [phone] (adults)  [phone] (children)      [facility] Mobile Crisis  [phone]      [facility] Mobile Crisis  [phone]    Community Resources      Fast Tracker  Linking people to mental health and substance use disorder resources  fasttrackermn.org        [employer] [place] [employer] Mental Illness (JASMIN)  987.868.7914 or 1.888.JASMIN.HELPS  https://namimn.org/        [facility]  [facility] residents only   [address]  [phone]        [facility]  Free mental health counseling  https://walkin.[email or web address]  [phone] X2    Mental Health Apps      Calm Harm  https://calmharm.co.[email or web address]      My3  https://my3app.[email or web address]      [employer] Safety Plan  https://www.[email or web address]

## 2024-07-28 ENCOUNTER — HEALTH MAINTENANCE LETTER (OUTPATIENT)
Age: 24
End: 2024-07-28

## 2025-08-10 ENCOUNTER — HEALTH MAINTENANCE LETTER (OUTPATIENT)
Age: 25
End: 2025-08-10